# Patient Record
(demographics unavailable — no encounter records)

---

## 2025-04-24 NOTE — DISCUSSION/SUMMARY
[FreeTextEntry1] : Cough and rhonchi- and recent exposure to brother with pneumonia- will start Zithromax. Monitor for any wheezing, stridor or worsening respiratory symptoms. Use saline nasal spray, cool mist humidifier, suctioning nares. Increase fluids as tolerated and monitor urine output to ensure hydration. If any fever that is persistent more than 5 days she should be seen right away. If any apnea or respiratory distress- call 911 and go to the closest ER. Sent albuterol to use every 4 hours prn wheezing.

## 2025-04-24 NOTE — HISTORY OF PRESENT ILLNESS
[Runny nose] : runny nose [Nasal congestion] : nasal congestion [Nasal Congestion] : nasal congestion [Cough] : cough [EENT/Resp Symptoms] : EENT/RESPIRATORY SYMPTOMS [___ Day(s)] : [unfilled] day(s) [Intermittent] : intermittent [Active] : active [Clear rhinorrhea] : clear rhinorrhea [Known Exposure to COVID-19] : no known exposure to COVID-19 [Hx of recent COVID-19 infection] : no history of recent COVID-19 infection [Sick Contacts: ___] : no sick contacts [Fever] : no fever [Headache] : no headache [Change in sleep] : no change in sleep  [Eye Redness] : no eye redness [Eye Discharge] : no eye discharge [Eye Itching] : no eye itching [Ear Pain] : no ear pain [Rhinorrhea] : no rhinorrhea [Sore Throat] : no sore throat [Palpitations] : no palpitations [Chest Pain] : no chest pain [Wheezing] : no wheezing [Shortness of Breath] : no shortness of breath [Tachypnea] : no tachypnea [Decreased Appetite] : no decreased appetite [Posttussive emesis] : no posttussive emesis [Vomiting] : no vomiting [Diarrhea] : no diarrhea [Decreased Urine Output] : no decreased urine output [Rash] : no rash

## 2025-04-24 NOTE — PHYSICAL EXAM
[NL] : warm, clear [Clear Rhinorrhea] : clear rhinorrhea [Rhonchi] : rhonchi [FreeTextEntry1] : harsh cough [FreeTextEntry7] : harsh cough